# Patient Record
Sex: FEMALE | Race: BLACK OR AFRICAN AMERICAN | Employment: FULL TIME | URBAN - METROPOLITAN AREA
[De-identification: names, ages, dates, MRNs, and addresses within clinical notes are randomized per-mention and may not be internally consistent; named-entity substitution may affect disease eponyms.]

---

## 2024-01-16 ENCOUNTER — HOSPITAL ENCOUNTER (EMERGENCY)
Age: 44
Discharge: HOME | End: 2024-01-16
Attending: STUDENT IN AN ORGANIZED HEALTH CARE EDUCATION/TRAINING PROGRAM
Payer: MEDICAID

## 2024-01-16 VITALS
DIASTOLIC BLOOD PRESSURE: 78 MMHG | OXYGEN SATURATION: 100 % | HEART RATE: 86 BPM | TEMPERATURE: 97.8 F | SYSTOLIC BLOOD PRESSURE: 106 MMHG | RESPIRATION RATE: 16 BRPM

## 2024-01-16 DIAGNOSIS — J06.9 URI WITH COUGH AND CONGESTION: Primary | ICD-10-CM

## 2024-01-16 LAB
INFLUENZA A BY PCR: NOT DETECTED
INFLUENZA B BY PCR: NOT DETECTED
SARS-COV-2 RDRP RESP QL NAA+PROBE: NOT DETECTED
SPECIMEN DESCRIPTION: NORMAL

## 2024-01-16 PROCEDURE — 87635 SARS-COV-2 COVID-19 AMP PRB: CPT

## 2024-01-16 PROCEDURE — 87502 INFLUENZA DNA AMP PROBE: CPT

## 2024-01-16 PROCEDURE — 99283 EMERGENCY DEPT VISIT LOW MDM: CPT

## 2024-01-16 RX ORDER — GUAIFENESIN/DEXTROMETHORPHAN 100-10MG/5
5 SYRUP ORAL EVERY 8 HOURS PRN
Qty: 120 ML | Refills: 0 | Status: SHIPPED | OUTPATIENT
Start: 2024-01-16

## 2024-01-16 RX ORDER — METHYLPREDNISOLONE 4 MG/1
TABLET ORAL
Qty: 1 KIT | Refills: 0 | Status: SHIPPED | OUTPATIENT
Start: 2024-01-16

## 2024-01-16 RX ORDER — BENZONATATE 100 MG/1
100 CAPSULE ORAL EVERY 8 HOURS PRN
Qty: 15 CAPSULE | Refills: 0 | Status: SHIPPED | OUTPATIENT
Start: 2024-01-16

## 2024-01-16 RX ORDER — CETIRIZINE HYDROCHLORIDE 10 MG/1
10 TABLET ORAL DAILY
Qty: 10 TABLET | Refills: 0 | Status: SHIPPED | OUTPATIENT
Start: 2024-01-16 | End: 2024-01-26

## 2024-01-16 ASSESSMENT — PAIN - FUNCTIONAL ASSESSMENT: PAIN_FUNCTIONAL_ASSESSMENT: NONE - DENIES PAIN

## 2024-01-16 ASSESSMENT — ENCOUNTER SYMPTOMS
NAUSEA: 0
ABDOMINAL PAIN: 0
DIARRHEA: 0
VOMITING: 0
SHORTNESS OF BREATH: 0
RHINORRHEA: 1
COUGH: 1

## 2024-01-16 NOTE — DISCHARGE INSTRUCTIONS
Please return to the ER for any new or worsening symptoms including but not limited to Chest pain or difficulty breathing  If prescribed, please be sure to  your prescriptions from the pharmacy  Please follow-up with Primary care provider as instructed

## 2024-01-16 NOTE — ED PROVIDER NOTES
above; nasal congestion is noted.  Oropharynx is clear and patent without edema, exudates or erythema.  Lungs CTA bilaterally, no wheezes rales or rhonchi, no respiratory distress.   Labs reviewed, rapid covid and flu are negative.    Recommend symptomatic/supportive care.  Prescriptions were provided for cetirizine, Medrol Dosepak, Tessalon Perles, guaifenesin-dextromethorphan.  Patient is otherwise well-appearing with stable vital signs, I do feel she is appropriate for discharge.  Results and plan were discussed with the patient, she voiced understanding and is amenable.  Strict return precautions were discussed.      While not exhaustive, the following diagnoses and their severity were considered: Viral illness, sinusitis.      Independent interpretation of Laboratory tests by Carina Marquez DO: Rapid covid and flu are negative.     Independent interpretation of Radiology tests by Carina Marquez DO: None      EKG reviewed and interpreted by me:  This EKG is signed by emergency department physician.    An EKG was not performed during this encounter.       Labs & imaging were reviewed and interpreted, see RESULTS. I have personally reviewed all laboratory and imaging results for this patient.       Are there any additional factors to consider that affect care (uninsured, homeless, illiterate, history from another source, etc.) (If yes, which ones).  No      Name and Route of medications administered in the ED:  Medications - No data to display        Re-Evaluations:               --------------------------------------------- PAST HISTORY ---------------------------------------------  Past Medical History:  has no past medical history on file.    Past Surgical History:  has a past surgical history that includes salpingectomy; Ovary removal; and Hysterectomy (OCTOBER 9, 2013).    Social History:  reports that she quit smoking about 8 years ago. Her smoking use included cigarettes. She does not have any

## 2024-05-21 ENCOUNTER — OFFICE VISIT (OUTPATIENT)
Age: 44
End: 2024-05-21
Payer: MEDICAID

## 2024-05-21 VITALS
BODY MASS INDEX: 23.43 KG/M2 | HEART RATE: 97 BPM | OXYGEN SATURATION: 97 % | SYSTOLIC BLOOD PRESSURE: 94 MMHG | WEIGHT: 173 LBS | HEIGHT: 72 IN | DIASTOLIC BLOOD PRESSURE: 70 MMHG

## 2024-05-21 DIAGNOSIS — N63.20 MASS OF LEFT BREAST, UNSPECIFIED QUADRANT: ICD-10-CM

## 2024-05-21 DIAGNOSIS — N89.8 VAGINAL DISCHARGE: ICD-10-CM

## 2024-05-21 DIAGNOSIS — Z12.72 SCREENING FOR VAGINAL CANCER: Primary | ICD-10-CM

## 2024-05-21 DIAGNOSIS — Z12.31 BREAST CANCER SCREENING BY MAMMOGRAM: ICD-10-CM

## 2024-05-21 PROCEDURE — 99204 OFFICE O/P NEW MOD 45 MIN: CPT | Performed by: LEGAL MEDICINE

## 2024-05-21 PROCEDURE — G8420 CALC BMI NORM PARAMETERS: HCPCS | Performed by: LEGAL MEDICINE

## 2024-05-21 PROCEDURE — 4004F PT TOBACCO SCREEN RCVD TLK: CPT | Performed by: LEGAL MEDICINE

## 2024-05-21 PROCEDURE — G8427 DOCREV CUR MEDS BY ELIG CLIN: HCPCS | Performed by: LEGAL MEDICINE

## 2024-05-21 RX ORDER — METRONIDAZOLE 500 MG/1
500 TABLET ORAL
Qty: 14 TABLET | Refills: 0 | Status: SHIPPED | OUTPATIENT
Start: 2024-05-21 | End: 2024-05-28

## 2024-05-21 ASSESSMENT — ENCOUNTER SYMPTOMS
BLOOD IN STOOL: 0
ABDOMINAL DISTENTION: 0
CONSTIPATION: 0
RECTAL PAIN: 0
NAUSEA: 0
DIARRHEA: 0
ABDOMINAL PAIN: 0
VOMITING: 0

## 2024-05-21 NOTE — PROGRESS NOTES
Camila Robbins     Patient presents for vaginal discharge for 1 month.  Discharge has remained unchanged.  No problems with her bowel or urinary function.  Was told approximately a year ago there was a mass in the left breast, and was to have an ultrasound of the breast, but was incarcerated at that time.  She denies feeling any masses in the breast.    Family history is significant for mother with breast cancer in her 40s.  No genetic testing was done.    Past GYN history: History of abnormal Pap, history of chlamydia.  No DVT PE other STDs.    Past OB history: Spontaneous vaginal delivery x 2, spontaneous AB x 1, IUFD x 1, ectopic pregnancy x 1.          Past Medical History:   Diagnosis Date    Breast disorder June 2023    Calcium build up on left breast    Menopausal symptoms 2020    Night sweats/hot flashes        Past Surgical History:   Procedure Laterality Date    HYSTERECTOMY (CERVIX STATUS UNKNOWN)  OCTOBER 9, 2013    LAPAROSCOPIC, ROBOTIC ASSISTED    OVARY REMOVAL      SALPINGECTOMY          Family History   Problem Relation Age of Onset    Breast Cancer Mother         Social History       Tobacco History       Smoking Status  Every Day Last Attempt to Quit  6/1/2015 Average Packs/Day  0.5 packs/day for 15.0 years (7.5 ttl pk-yrs) Smoking Tobacco Type  Cigarettes last attempt to quit 6/1/2015   Pack Year History     Packs/Day From To Years    0 6/1/2015  9.0    0.5   15.0    0.5   0.0      Smokeless Tobacco Use  Unknown              Alcohol History       Alcohol Use Status  Yes Drinks/Week  0 Glasses of wine, 0 Cans of beer, 1-2 Shots of liquor, 0 Drinks containing 0.5 oz of alcohol per week Amount  1.0 - 2.0 standard drink of alcohol/wk Comment  socially              Drug Use       Drug Use Status  Yes Types  Marijuana (Weed) Frequency   7 times/week Comment  marijuana 3-4 x week last use 10/1/13              Sexual Activity       Sexually Active  Yes Partners  Male                      Current Outpatient

## 2024-05-24 DIAGNOSIS — N89.8 VAGINAL DISCHARGE: ICD-10-CM

## 2024-05-24 LAB — GYNECOLOGY CYTOLOGY REPORT: NORMAL

## 2024-07-21 ENCOUNTER — HOSPITAL ENCOUNTER (INPATIENT)
Age: 44
LOS: 1 days | Discharge: LEFT BEFORE DISCHARGE | End: 2024-07-23
Attending: STUDENT IN AN ORGANIZED HEALTH CARE EDUCATION/TRAINING PROGRAM | Admitting: SURGERY
Payer: MEDICAID

## 2024-07-21 DIAGNOSIS — K76.9 LIVER LESION: ICD-10-CM

## 2024-07-21 DIAGNOSIS — I95.9 HYPOTENSION, UNSPECIFIED HYPOTENSION TYPE: ICD-10-CM

## 2024-07-21 DIAGNOSIS — R10.9 ABDOMINAL PAIN, UNSPECIFIED ABDOMINAL LOCATION: Primary | ICD-10-CM

## 2024-07-21 PROCEDURE — 99285 EMERGENCY DEPT VISIT HI MDM: CPT

## 2024-07-21 ASSESSMENT — PAIN DESCRIPTION - FREQUENCY: FREQUENCY: CONTINUOUS

## 2024-07-21 ASSESSMENT — PAIN SCALES - GENERAL: PAINLEVEL_OUTOF10: 10

## 2024-07-21 ASSESSMENT — PAIN - FUNCTIONAL ASSESSMENT: PAIN_FUNCTIONAL_ASSESSMENT: 0-10

## 2024-07-21 ASSESSMENT — PAIN DESCRIPTION - PAIN TYPE: TYPE: ACUTE PAIN

## 2024-07-21 ASSESSMENT — LIFESTYLE VARIABLES: HOW OFTEN DO YOU HAVE A DRINK CONTAINING ALCOHOL: NEVER

## 2024-07-21 ASSESSMENT — PAIN DESCRIPTION - LOCATION: LOCATION: ABDOMEN

## 2024-07-22 ENCOUNTER — APPOINTMENT (OUTPATIENT)
Dept: GENERAL RADIOLOGY | Age: 44
End: 2024-07-22
Payer: MEDICAID

## 2024-07-22 ENCOUNTER — APPOINTMENT (OUTPATIENT)
Dept: CT IMAGING | Age: 44
End: 2024-07-22
Payer: MEDICAID

## 2024-07-22 ENCOUNTER — APPOINTMENT (OUTPATIENT)
Dept: MRI IMAGING | Age: 44
End: 2024-07-22
Payer: MEDICAID

## 2024-07-22 PROBLEM — F12.10 MARIJUANA ABUSE: Status: ACTIVE | Noted: 2024-07-22

## 2024-07-22 PROBLEM — E86.1 HYPOTENSION DUE TO HYPOVOLEMIA: Status: ACTIVE | Noted: 2024-07-22

## 2024-07-22 PROBLEM — R10.9 ABDOMINAL PAIN: Status: ACTIVE | Noted: 2024-07-22

## 2024-07-22 PROBLEM — R10.84 DIFFUSE ABDOMINAL PAIN: Status: ACTIVE | Noted: 2024-07-22

## 2024-07-22 PROBLEM — A08.4 VIRAL GASTROENTERITIS: Status: ACTIVE | Noted: 2024-07-22

## 2024-07-22 PROBLEM — E86.0 DEHYDRATION: Status: ACTIVE | Noted: 2024-07-22

## 2024-07-22 PROBLEM — Z72.0 TOBACCO ABUSE: Status: ACTIVE | Noted: 2024-07-22

## 2024-07-22 PROBLEM — E27.8 RIGHT ADRENAL MASS (HCC): Status: ACTIVE | Noted: 2024-07-22

## 2024-07-22 PROBLEM — K76.9 LIVER LESION: Status: ACTIVE | Noted: 2024-07-22

## 2024-07-22 PROBLEM — R16.0 LIVER MASSES: Status: ACTIVE | Noted: 2024-07-22

## 2024-07-22 LAB
ALBUMIN SERPL-MCNC: 4.1 G/DL (ref 3.5–5.2)
ALP SERPL-CCNC: 87 U/L (ref 35–104)
ALT SERPL-CCNC: 8 U/L (ref 0–32)
ANION GAP SERPL CALCULATED.3IONS-SCNC: 14 MMOL/L (ref 7–16)
AST SERPL-CCNC: 24 U/L (ref 0–31)
BACTERIA URNS QL MICRO: ABNORMAL
BASOPHILS # BLD: 0 K/UL (ref 0–0.2)
BASOPHILS NFR BLD: 0 % (ref 0–2)
BILIRUB SERPL-MCNC: 0.5 MG/DL (ref 0–1.2)
BILIRUB UR QL STRIP: ABNORMAL
BUN SERPL-MCNC: 20 MG/DL (ref 6–20)
CALCIUM SERPL-MCNC: 9 MG/DL (ref 8.6–10.2)
CASTS #/AREA URNS LPF: ABNORMAL /LPF
CHLORIDE SERPL-SCNC: 94 MMOL/L (ref 98–107)
CK SERPL-CCNC: 124 U/L (ref 20–180)
CLARITY UR: ABNORMAL
CO2 SERPL-SCNC: 24 MMOL/L (ref 22–29)
COLOR UR: YELLOW
CORTIS SERPL-MCNC: 25.9 UG/DL (ref 2.7–18.4)
CORTISOL COLLECTION INFO: ABNORMAL
CREAT SERPL-MCNC: 1.3 MG/DL (ref 0.5–1)
EOSINOPHIL # BLD: 0 K/UL (ref 0.05–0.5)
EOSINOPHILS RELATIVE PERCENT: 0 % (ref 0–6)
ERYTHROCYTE [DISTWIDTH] IN BLOOD BY AUTOMATED COUNT: 11.8 % (ref 11.5–15)
GFR, ESTIMATED: 53 ML/MIN/1.73M2
GLUCOSE SERPL-MCNC: 101 MG/DL (ref 74–99)
GLUCOSE UR STRIP-MCNC: NEGATIVE MG/DL
HCT VFR BLD AUTO: 45.9 % (ref 34–48)
HGB BLD-MCNC: 16 G/DL (ref 11.5–15.5)
HGB UR QL STRIP.AUTO: NEGATIVE
INFLUENZA A BY PCR: NOT DETECTED
INFLUENZA B BY PCR: NOT DETECTED
KETONES UR STRIP-MCNC: 40 MG/DL
LACTATE BLDV-SCNC: 1.8 MMOL/L (ref 0.5–1.9)
LEUKOCYTE ESTERASE UR QL STRIP: NEGATIVE
LIPASE SERPL-CCNC: 33 U/L (ref 13–60)
LYMPHOCYTES NFR BLD: 1.02 K/UL (ref 1.5–4)
LYMPHOCYTES RELATIVE PERCENT: 20 % (ref 20–42)
MCH RBC QN AUTO: 34.8 PG (ref 26–35)
MCHC RBC AUTO-ENTMCNC: 34.9 G/DL (ref 32–34.5)
MCV RBC AUTO: 99.8 FL (ref 80–99.9)
MONOCYTES NFR BLD: 0.35 K/UL (ref 0.1–0.95)
MONOCYTES NFR BLD: 7 % (ref 2–12)
MUCOUS THREADS URNS QL MICRO: PRESENT
NEUTROPHILS NFR BLD: 73 % (ref 43–80)
NEUTS SEG NFR BLD: 3.73 K/UL (ref 1.8–7.3)
NITRITE UR QL STRIP: NEGATIVE
PH UR STRIP: 6 [PH] (ref 5–9)
PLATELET, FLUORESCENCE: 117 K/UL (ref 130–450)
PMV BLD AUTO: 11.3 FL (ref 7–12)
POTASSIUM SERPL-SCNC: 3.6 MMOL/L (ref 3.5–5)
PROT SERPL-MCNC: 7.7 G/DL (ref 6.4–8.3)
PROT UR STRIP-MCNC: 100 MG/DL
RBC # BLD AUTO: 4.6 M/UL (ref 3.5–5.5)
RBC # BLD: ABNORMAL 10*6/UL
RBC # BLD: ABNORMAL 10*6/UL
RBC #/AREA URNS HPF: ABNORMAL /HPF
SARS-COV-2 RDRP RESP QL NAA+PROBE: NOT DETECTED
SODIUM SERPL-SCNC: 132 MMOL/L (ref 132–146)
SP GR UR STRIP: >1.03 (ref 1–1.03)
SPECIMEN DESCRIPTION: NORMAL
TROPONIN I SERPL HS-MCNC: <6 NG/L (ref 0–9)
UROBILINOGEN UR STRIP-ACNC: 1 EU/DL (ref 0–1)
WBC #/AREA URNS HPF: ABNORMAL /HPF
WBC OTHER # BLD: 5.1 K/UL (ref 4.5–11.5)

## 2024-07-22 PROCEDURE — 6370000000 HC RX 637 (ALT 250 FOR IP): Performed by: STUDENT IN AN ORGANIZED HEALTH CARE EDUCATION/TRAINING PROGRAM

## 2024-07-22 PROCEDURE — 87040 BLOOD CULTURE FOR BACTERIA: CPT

## 2024-07-22 PROCEDURE — A9577 INJ MULTIHANCE: HCPCS | Performed by: RADIOLOGY

## 2024-07-22 PROCEDURE — 74183 MRI ABD W/O CNTR FLWD CNTR: CPT

## 2024-07-22 PROCEDURE — 6360000004 HC RX CONTRAST MEDICATION: Performed by: RADIOLOGY

## 2024-07-22 PROCEDURE — 81001 URINALYSIS AUTO W/SCOPE: CPT

## 2024-07-22 PROCEDURE — 6370000000 HC RX 637 (ALT 250 FOR IP): Performed by: FAMILY MEDICINE

## 2024-07-22 PROCEDURE — 99222 1ST HOSP IP/OBS MODERATE 55: CPT | Performed by: INTERNAL MEDICINE

## 2024-07-22 PROCEDURE — 82550 ASSAY OF CK (CPK): CPT

## 2024-07-22 PROCEDURE — 2060000000 HC ICU INTERMEDIATE R&B

## 2024-07-22 PROCEDURE — 71046 X-RAY EXAM CHEST 2 VIEWS: CPT

## 2024-07-22 PROCEDURE — 96374 THER/PROPH/DIAG INJ IV PUSH: CPT

## 2024-07-22 PROCEDURE — 2580000003 HC RX 258: Performed by: SURGERY

## 2024-07-22 PROCEDURE — 85025 COMPLETE CBC W/AUTO DIFF WBC: CPT

## 2024-07-22 PROCEDURE — 83605 ASSAY OF LACTIC ACID: CPT

## 2024-07-22 PROCEDURE — 6360000002 HC RX W HCPCS: Performed by: STUDENT IN AN ORGANIZED HEALTH CARE EDUCATION/TRAINING PROGRAM

## 2024-07-22 PROCEDURE — 6360000002 HC RX W HCPCS: Performed by: SURGERY

## 2024-07-22 PROCEDURE — 80053 COMPREHEN METABOLIC PANEL: CPT

## 2024-07-22 PROCEDURE — 87086 URINE CULTURE/COLONY COUNT: CPT

## 2024-07-22 PROCEDURE — 74177 CT ABD & PELVIS W/CONTRAST: CPT

## 2024-07-22 PROCEDURE — 87635 SARS-COV-2 COVID-19 AMP PRB: CPT

## 2024-07-22 PROCEDURE — 82533 TOTAL CORTISOL: CPT

## 2024-07-22 PROCEDURE — 96361 HYDRATE IV INFUSION ADD-ON: CPT

## 2024-07-22 PROCEDURE — 96375 TX/PRO/DX INJ NEW DRUG ADDON: CPT

## 2024-07-22 PROCEDURE — 83690 ASSAY OF LIPASE: CPT

## 2024-07-22 PROCEDURE — 87502 INFLUENZA DNA AMP PROBE: CPT

## 2024-07-22 PROCEDURE — 99223 1ST HOSP IP/OBS HIGH 75: CPT | Performed by: SURGERY

## 2024-07-22 PROCEDURE — 2580000003 HC RX 258: Performed by: STUDENT IN AN ORGANIZED HEALTH CARE EDUCATION/TRAINING PROGRAM

## 2024-07-22 PROCEDURE — 84484 ASSAY OF TROPONIN QUANT: CPT

## 2024-07-22 PROCEDURE — 6370000000 HC RX 637 (ALT 250 FOR IP): Performed by: INTERNAL MEDICINE

## 2024-07-22 PROCEDURE — 36415 COLL VENOUS BLD VENIPUNCTURE: CPT

## 2024-07-22 PROCEDURE — 93005 ELECTROCARDIOGRAM TRACING: CPT | Performed by: STUDENT IN AN ORGANIZED HEALTH CARE EDUCATION/TRAINING PROGRAM

## 2024-07-22 RX ORDER — ONDANSETRON 2 MG/ML
4 INJECTION INTRAMUSCULAR; INTRAVENOUS EVERY 6 HOURS PRN
Status: DISCONTINUED | OUTPATIENT
Start: 2024-07-22 | End: 2024-07-23 | Stop reason: HOSPADM

## 2024-07-22 RX ORDER — ENOXAPARIN SODIUM 100 MG/ML
40 INJECTION SUBCUTANEOUS DAILY
Status: DISCONTINUED | OUTPATIENT
Start: 2024-07-22 | End: 2024-07-23 | Stop reason: HOSPADM

## 2024-07-22 RX ORDER — FENTANYL CITRATE 0.05 MG/ML
50 INJECTION, SOLUTION INTRAMUSCULAR; INTRAVENOUS ONCE
Status: COMPLETED | OUTPATIENT
Start: 2024-07-22 | End: 2024-07-22

## 2024-07-22 RX ORDER — SODIUM CHLORIDE 0.9 % (FLUSH) 0.9 %
5-40 SYRINGE (ML) INJECTION EVERY 12 HOURS SCHEDULED
Status: DISCONTINUED | OUTPATIENT
Start: 2024-07-22 | End: 2024-07-23 | Stop reason: HOSPADM

## 2024-07-22 RX ORDER — POTASSIUM CHLORIDE 20 MEQ/1
40 TABLET, EXTENDED RELEASE ORAL PRN
Status: DISCONTINUED | OUTPATIENT
Start: 2024-07-22 | End: 2024-07-23 | Stop reason: HOSPADM

## 2024-07-22 RX ORDER — 0.9 % SODIUM CHLORIDE 0.9 %
1000 INTRAVENOUS SOLUTION INTRAVENOUS ONCE
Status: COMPLETED | OUTPATIENT
Start: 2024-07-22 | End: 2024-07-22

## 2024-07-22 RX ORDER — ACETAMINOPHEN 500 MG
1000 TABLET ORAL ONCE
Status: COMPLETED | OUTPATIENT
Start: 2024-07-22 | End: 2024-07-22

## 2024-07-22 RX ORDER — DIPHENHYDRAMINE HYDROCHLORIDE 50 MG/ML
25 INJECTION INTRAMUSCULAR; INTRAVENOUS ONCE
Status: COMPLETED | OUTPATIENT
Start: 2024-07-22 | End: 2024-07-22

## 2024-07-22 RX ORDER — MAGNESIUM SULFATE IN WATER 40 MG/ML
2000 INJECTION, SOLUTION INTRAVENOUS PRN
Status: DISCONTINUED | OUTPATIENT
Start: 2024-07-22 | End: 2024-07-23 | Stop reason: HOSPADM

## 2024-07-22 RX ORDER — ACETAMINOPHEN 325 MG/1
650 TABLET ORAL EVERY 6 HOURS PRN
Status: DISCONTINUED | OUTPATIENT
Start: 2024-07-22 | End: 2024-07-23 | Stop reason: HOSPADM

## 2024-07-22 RX ORDER — POTASSIUM CHLORIDE 7.45 MG/ML
10 INJECTION INTRAVENOUS PRN
Status: DISCONTINUED | OUTPATIENT
Start: 2024-07-22 | End: 2024-07-23 | Stop reason: HOSPADM

## 2024-07-22 RX ORDER — MIDODRINE HYDROCHLORIDE 5 MG/1
5 TABLET ORAL
Status: DISCONTINUED | OUTPATIENT
Start: 2024-07-22 | End: 2024-07-23

## 2024-07-22 RX ORDER — ONDANSETRON 4 MG/1
4 TABLET, ORALLY DISINTEGRATING ORAL EVERY 8 HOURS PRN
Status: DISCONTINUED | OUTPATIENT
Start: 2024-07-22 | End: 2024-07-23 | Stop reason: HOSPADM

## 2024-07-22 RX ORDER — SODIUM CHLORIDE 9 MG/ML
INJECTION, SOLUTION INTRAVENOUS PRN
Status: DISCONTINUED | OUTPATIENT
Start: 2024-07-22 | End: 2024-07-23 | Stop reason: HOSPADM

## 2024-07-22 RX ORDER — TRAMADOL HYDROCHLORIDE 50 MG/1
50 TABLET ORAL EVERY 6 HOURS PRN
Status: DISCONTINUED | OUTPATIENT
Start: 2024-07-22 | End: 2024-07-23 | Stop reason: HOSPADM

## 2024-07-22 RX ORDER — TRAMADOL HYDROCHLORIDE 50 MG/1
25 TABLET ORAL EVERY 6 HOURS PRN
Status: DISCONTINUED | OUTPATIENT
Start: 2024-07-22 | End: 2024-07-23 | Stop reason: HOSPADM

## 2024-07-22 RX ORDER — MORPHINE SULFATE 4 MG/ML
4 INJECTION, SOLUTION INTRAMUSCULAR; INTRAVENOUS ONCE
Status: COMPLETED | OUTPATIENT
Start: 2024-07-22 | End: 2024-07-22

## 2024-07-22 RX ORDER — KETOROLAC TROMETHAMINE 30 MG/ML
30 INJECTION, SOLUTION INTRAMUSCULAR; INTRAVENOUS EVERY 6 HOURS PRN
Status: DISCONTINUED | OUTPATIENT
Start: 2024-07-22 | End: 2024-07-22

## 2024-07-22 RX ORDER — SODIUM CHLORIDE, SODIUM LACTATE, POTASSIUM CHLORIDE, CALCIUM CHLORIDE 600; 310; 30; 20 MG/100ML; MG/100ML; MG/100ML; MG/100ML
INJECTION, SOLUTION INTRAVENOUS CONTINUOUS
Status: DISCONTINUED | OUTPATIENT
Start: 2024-07-22 | End: 2024-07-23 | Stop reason: HOSPADM

## 2024-07-22 RX ORDER — SODIUM CHLORIDE 0.9 % (FLUSH) 0.9 %
5-40 SYRINGE (ML) INJECTION PRN
Status: DISCONTINUED | OUTPATIENT
Start: 2024-07-22 | End: 2024-07-23 | Stop reason: HOSPADM

## 2024-07-22 RX ADMIN — SODIUM CHLORIDE, PRESERVATIVE FREE 10 ML: 5 INJECTION INTRAVENOUS at 12:19

## 2024-07-22 RX ADMIN — IOPAMIDOL 80 ML: 755 INJECTION, SOLUTION INTRAVENOUS at 03:59

## 2024-07-22 RX ADMIN — ACETAMINOPHEN 650 MG: 325 TABLET ORAL at 23:01

## 2024-07-22 RX ADMIN — ENOXAPARIN SODIUM 40 MG: 100 INJECTION SUBCUTANEOUS at 12:18

## 2024-07-22 RX ADMIN — ACETAMINOPHEN 1000 MG: 500 TABLET ORAL at 03:09

## 2024-07-22 RX ADMIN — SODIUM CHLORIDE 1000 ML: 9 INJECTION, SOLUTION INTRAVENOUS at 06:15

## 2024-07-22 RX ADMIN — GADOBENATE DIMEGLUMINE 15 ML: 529 INJECTION, SOLUTION INTRAVENOUS at 15:43

## 2024-07-22 RX ADMIN — SODIUM CHLORIDE, POTASSIUM CHLORIDE, SODIUM LACTATE AND CALCIUM CHLORIDE: 600; 310; 30; 20 INJECTION, SOLUTION INTRAVENOUS at 12:22

## 2024-07-22 RX ADMIN — SODIUM CHLORIDE 1000 ML: 9 INJECTION, SOLUTION INTRAVENOUS at 05:06

## 2024-07-22 RX ADMIN — FENTANYL CITRATE 50 MCG: 50 INJECTION INTRAMUSCULAR; INTRAVENOUS at 00:51

## 2024-07-22 RX ADMIN — DIPHENHYDRAMINE HYDROCHLORIDE 25 MG: 50 INJECTION, SOLUTION INTRAMUSCULAR; INTRAVENOUS at 04:08

## 2024-07-22 RX ADMIN — MIDODRINE HYDROCHLORIDE 5 MG: 5 TABLET ORAL at 15:19

## 2024-07-22 RX ADMIN — MORPHINE SULFATE 4 MG: 4 INJECTION, SOLUTION INTRAMUSCULAR; INTRAVENOUS at 03:09

## 2024-07-22 RX ADMIN — WATER 80 MG: 1 INJECTION INTRAMUSCULAR; INTRAVENOUS; SUBCUTANEOUS at 04:11

## 2024-07-22 RX ADMIN — SODIUM CHLORIDE 1000 ML: 9 INJECTION, SOLUTION INTRAVENOUS at 03:11

## 2024-07-22 ASSESSMENT — PAIN SCALES - GENERAL: PAINLEVEL_OUTOF10: 7

## 2024-07-22 ASSESSMENT — ENCOUNTER SYMPTOMS
BACK PAIN: 0
COUGH: 1
NAUSEA: 0
VOMITING: 0
ABDOMINAL PAIN: 1
SHORTNESS OF BREATH: 0
DIARRHEA: 1
COLOR CHANGE: 0

## 2024-07-22 ASSESSMENT — LIFESTYLE VARIABLES
HOW OFTEN DO YOU HAVE A DRINK CONTAINING ALCOHOL: 2-4 TIMES A MONTH
HOW MANY STANDARD DRINKS CONTAINING ALCOHOL DO YOU HAVE ON A TYPICAL DAY: 1 OR 2

## 2024-07-22 ASSESSMENT — PAIN DESCRIPTION - DESCRIPTORS: DESCRIPTORS: ACHING

## 2024-07-22 ASSESSMENT — PAIN DESCRIPTION - LOCATION: LOCATION: HEAD

## 2024-07-22 NOTE — CONSULTS
[address] [address]                              CONSULTATION      PATIENT NAME: JAMES MUNGUIA                : 1980  MED REC NO: [id number]                        ROOM: 0528  ACCOUNT NO: [id number]                       ADMIT DATE: 2024  PROVIDER: Tono Yepez MD      CONSULT DATE: 2024    CHIEF COMPLAINT:  Adrenal mass.    HISTORY OF PRESENT ILLNESS:  This is a 44-year-old female who was admitted to the hospital for severe abdominal pain, underwent a CT of her abdomen.  She was noted to have a right adrenal enlargement which was measured at 3.5 cm.  She also had a CT done in , and it appeared that she had some enlargement of adrenal at that time, although was not as large as it is now.  The patient does not have any bothersome history of urinary problem such as hematuria or flank pain.  She does have a history of urgency and urgency incontinence.  She was given apparently oxybutynin in the past which did help, but it did dry her mouth out considerably, so she discontinued the medication.  She denies any episodes of severe hypertension or flushing.  Her abdominal pain which brought her to the hospital has abated and she is in the process of obtaining an MRI to evaluate her adrenal lesion.    PAST MEDICAL HISTORY:  Significant for breast disorder and menopausal symptoms.    PAST SURGICAL HISTORY:  She has had a hysterectomy and salpingectomy.    ALLERGIES:  TO MORPHINE.      MEDICATIONS:  Include prednisone, Tessalon, guaifenesin, naproxen, Lioresal.    SOCIAL HISTORY:  She does smoke cigarettes and does use marijuana at times.    PHYSICAL EXAMINATION:  GENERAL:  The patient is alert and oriented female, who appears to be comfortable.  ABDOMEN:  Unremarkable.    IMPRESSION:  Incidental finding of adrenal enlargement.  The patient is to have an MRI which hopefully would demonstrate perhaps the benign

## 2024-07-22 NOTE — ED PROVIDER NOTES
HPI   44-year-old female patient presents emergency department for evaluation of intermittent crampy abdominal pain ongoing for about 2 days.  Centered in the epigastrium.  She states that it comes on suddenly, then eases up and then comes back.  She had 1 episode of diarrhea, nonbloody.  She is also having associated cough, body aches and chills that has been ongoing for about 3 days.  No nausea vomiting.  No chest pain or shortness of breath.  Review of Systems   Constitutional:  Positive for fatigue and fever. Negative for chills.   HENT:  Negative for congestion.    Respiratory:  Positive for cough. Negative for shortness of breath.    Cardiovascular:  Negative for chest pain.   Gastrointestinal:  Positive for abdominal pain and diarrhea. Negative for nausea and vomiting.   Genitourinary:  Negative for difficulty urinating, dysuria and hematuria.   Musculoskeletal:  Negative for back pain.   Skin:  Negative for color change.   All other systems reviewed and are negative.       Physical Exam  Vitals and nursing note reviewed.   Constitutional:       Appearance: Normal appearance.   HENT:      Head: Normocephalic and atraumatic.      Nose: Nose normal. No congestion.      Mouth/Throat:      Mouth: Mucous membranes are moist.      Pharynx: Oropharynx is clear.   Eyes:      Conjunctiva/sclera: Conjunctivae normal.      Pupils: Pupils are equal, round, and reactive to light.   Cardiovascular:      Rate and Rhythm: Normal rate and regular rhythm.      Pulses: Normal pulses.      Heart sounds: Normal heart sounds.   Pulmonary:      Effort: Pulmonary effort is normal. No respiratory distress.      Breath sounds: Normal breath sounds.   Abdominal:      General: There is no distension.      Comments: Tenderness, epigastrium   Musculoskeletal:         General: Normal range of motion.      Cervical back: Normal range of motion and neck supple.   Skin:     General: Skin is warm and dry.      Capillary Refill: Capillary

## 2024-07-22 NOTE — H&P
General Surgery History and Physical    Patient's Name/Date of Birth: Camila Robbins / 1980    Date: July 22, 2024     Surgeon: Johnny Flores M.D.    PCP: No primary care provider on file.     Chief Complaint: abd pain and low bp    HPI:   Camila Robbins is a 44 y.o. female who presents for evaluation of abd pain and low bp for several days and came to ED for this reason and all workup was basically negative with likely dx of gastroenteritis but her bp stayed low so she was admitted for further monitoring. Her pain has since resolved and she tolerated a regular diet this am. Timing was constant, radiation to mid upper abdomen, alleviated by pain meds and time and started 3 days ago and severity is 2-9/10      Past Medical History:   Diagnosis Date    Breast disorder June 2023    Calcium build up on left breast    Menopausal symptoms 2020    Night sweats/hot flashes       Past Surgical History:   Procedure Laterality Date    HYSTERECTOMY (CERVIX STATUS UNKNOWN)  OCTOBER 9, 2013    LAPAROSCOPIC, ROBOTIC ASSISTED    OVARY REMOVAL      SALPINGECTOMY         Current Facility-Administered Medications   Medication Dose Route Frequency Provider Last Rate Last Admin    sodium chloride flush 0.9 % injection 5-40 mL  5-40 mL IntraVENous 2 times per day Johnny Flores MD        sodium chloride flush 0.9 % injection 5-40 mL  5-40 mL IntraVENous PRN Johnny Flores MD        0.9 % sodium chloride infusion   IntraVENous PRN Johnny Flores MD        potassium chloride (KLOR-CON M) extended release tablet 40 mEq  40 mEq Oral PRN Johnny Flores MD        Or    potassium bicarb-citric acid (EFFER-K) effervescent tablet 40 mEq  40 mEq Oral PRN Johnny Flores MD        Or    potassium chloride 10 mEq/100 mL IVPB (Peripheral Line)  10 mEq IntraVENous PRN Johnny Flores MD        magnesium sulfate 2000 mg in 50 mL IVPB premix  2,000 mg IntraVENous PRN Johnny Flores MD        ondansetron (ZOFRAN-ODT) disintegrating  tablet 4 mg  4 mg Oral Q8H PRN Johnny Flores MD        Or    ondansetron (ZOFRAN) injection 4 mg  4 mg IntraVENous Q6H PRN Johnny Flores MD        enoxaparin (LOVENOX) injection 40 mg  40 mg SubCUTAneous Daily Johnny Folres MD        lactated ringers IV soln infusion   IntraVENous Continuous Johnny Flores MD        traMADol (ULTRAM) tablet 25 mg  25 mg Oral Q6H PRN Johnny Flores MD        Or    traMADol (ULTRAM) tablet 50 mg  50 mg Oral Q6H PRN Johnny Flores MD        ketorolac (TORADOL) injection 30 mg  30 mg IntraVENous Q6H PRN Johnny Flores MD           Allergies   Allergen Reactions    Morphine Hives       The patient has a family history that is negative for severe cardiovascular or respiratory issues, negative for reaction to anesthesia.    Social History     Socioeconomic History    Marital status: Single     Spouse name: Not on file    Number of children: Not on file    Years of education: Not on file    Highest education level: Not on file   Occupational History    Not on file   Tobacco Use    Smoking status: Every Day     Current packs/day: 0.00     Average packs/day: 0.5 packs/day for 15.0 years (7.5 ttl pk-yrs)     Types: Cigarettes     Last attempt to quit: 2015     Years since quittin.1    Smokeless tobacco: Not on file   Substance and Sexual Activity    Alcohol use: Yes     Alcohol/week: 1.0 - 2.0 standard drink of alcohol     Types: 1 - 2 Shots of liquor per week     Comment: socially    Drug use: Yes     Frequency: 7.0 times per week     Types: Marijuana (Weed)     Comment: marijuana 3-4 x week last use 10/1/13    Sexual activity: Yes     Partners: Male   Other Topics Concern    Not on file   Social History Narrative    Not on file     Social Determinants of Health     Financial Resource Strain: Not on file   Food Insecurity: Not on file   Transportation Needs: Not on file   Physical Activity: Not on file   Stress: Not on file   Social Connections: Not on file

## 2024-07-22 NOTE — PROGRESS NOTES
4 Eyes Skin Assessment     NAME:  Camila Robbins  YOB: 1980  MEDICAL RECORD NUMBER:  [id number]    The patient is being assessed for  Admission    I agree that at least one RN has performed a thorough Head to Toe Skin Assessment on the patient. ALL assessment sites listed below have been assessed.      Areas assessed by both nurses:    Head, Face, Ears, Shoulders, Back, Chest, Arms, Elbows, Hands, Sacrum. Buttock, Coccyx, Ischium, and Legs. Feet and Heels        Does the Patient have a Wound? No noted wound(s)       Oracio Prevention initiated by RN: No;N/A  Wound Care Orders initiated by RN: No; N/A    Pressure Injury (Stage 3,4, Unstageable, DTI, NWPT, and Complex wounds) if present, place Wound referral order by RN under : No    New Ostomies, if present place, Ostomy referral order under : No     Nurse 1 eSignature: Electronically signed by Almita Pool RN on 7/22/24 at 12:35 PM EDT    **SHARE this note so that the co-signing nurse can place an eSignature**    Nurse 2 eSignature: {Esignature:[id number]}

## 2024-07-22 NOTE — CONSULTS
[facility]   Consult for Medical Management      Reason for Consult:  Medical management    History of Present Illness:  44 y.o. female with past medical history of tobacco abuse marijuana use  presents with abdominal pain for the last 2 or 3 days.  She has not eaten since Friday.  Its mainly epigastric with nausea but no vomiting she did have diarrhea a few times watery and she took Pepto-Bismol which made her stools black.  But because her abdominal pain was getting worse she came to the emergency room for further evaluation.  Patient was able to keep significant food or drinks down she did have some fever and chills she does not know what her fever was at home.  But in the emergency room her temperature was 100.8.    Patient stated that she did not eat anything different or outside the house she ate just a hot dog at home on Thursday before she started having those symptoms.  In the emergency room a CT scan of the abdomen revealed fluid-filled small bowel loops and possible enteritis and stomach appears thick.  Multiple areas on hyper enhanced in the liver will need to do MRI to evaluate and also right adrenal mass  Patient was given morphine in the emergency room her pressure was dropping in the 80s they gave her some fluids but still was in the 80s so she was admitted for further evaluation.  Informant(s) for H&P: patient and EMR     REVIEW OF SYSTEMS:  Complete ROS performed with patient, pertinent positives and negatives are listed in the HPI.    PMH:  Past Medical History:   Diagnosis Date    Breast disorder June 2023    Calcium build up on left breast    Menopausal symptoms 2020    Night sweats/hot flashes       Surgical History:  Past Surgical History:   Procedure Laterality Date    HYSTERECTOMY (CERVIX STATUS UNKNOWN)  OCTOBER 9, 2013    LAPAROSCOPIC, ROBOTIC ASSISTED    OVARY REMOVAL      SALPINGECTOMY         Medications Prior to Admission:    Prior to

## 2024-07-22 NOTE — DISCHARGE INSTRUCTIONS
Follow-up with gastroenterologist listed for the liver lesion.    Call upon discharge to schedule a follow-up visit with Martha Douglas/Lucho/Susie ([facility]) at [phone]

## 2024-07-22 NOTE — ED PROVIDER NOTES
ED Course as of 07/22/24 0907 Mon Jul 22, 2024   0122 EKG shows sinus rhythm 104 bpm, no acute ST elevations or depressions, QTc normal.  No prior to compare to.  Interpreted by me. [FG]      ED Course User Index  [FG] Lionel Sullivan DO     ED Course as of 07/22/24 0907 Mon Jul 22, 2024   0122 EKG shows sinus rhythm 104 bpm, no acute ST elevations or depressions, QTc normal.  No prior to compare to.  Interpreted by me. [FG]      ED Course User Index  [FG] Lionel Sullivan DO       --------------------------------------------- PAST HISTORY ---------------------------------------------  Past Medical History:  has a past medical history of Breast disorder and Menopausal symptoms.    Past Surgical History:  has a past surgical history that includes salpingectomy; Ovary removal; and Hysterectomy (OCTOBER 9, 2013).    Social History:  reports that she has been smoking cigarettes. She has a 7.5 pack-year smoking history. She does not have any smokeless tobacco history on file. She reports current alcohol use of about 1.0 - 2.0 standard drink of alcohol per week. She reports current drug use. Frequency: 7.00 times per week. Drug: Marijuana (Weed).    Family History: family history includes Breast Cancer in her mother.     The patient’s home medications have been reviewed.    Allergies: Morphine    -------------------------------------------------- RESULTS -------------------------------------------------    Lab  Results for orders placed or performed during the hospital encounter of 07/21/24   Culture, Blood 1    Specimen: Blood   Result Value Ref Range    Specimen Description .BLOOD     Special Requests          Culture NO GROWTH <24 HRS    Culture, Blood 2    Specimen: Blood   Result Value Ref Range    Specimen Description .BLOOD     Special Requests Site: Blood     Culture NO GROWTH <24 HRS    COVID-19, Rapid    Specimen: Nasopharyngeal Swab   Result Value Ref Range    Specimen Description .NASOPHARYNGEAL SWAB

## 2024-07-22 NOTE — ED NOTES
Ambulated patient in the hallway. HR 93 Sp02 97% Blood Pressure 98/57. No dizziness or weakness noted.

## 2024-07-23 VITALS
SYSTOLIC BLOOD PRESSURE: 99 MMHG | OXYGEN SATURATION: 97 % | WEIGHT: 160 LBS | HEART RATE: 82 BPM | HEIGHT: 72 IN | BODY MASS INDEX: 21.67 KG/M2 | RESPIRATION RATE: 20 BRPM | TEMPERATURE: 98.3 F | DIASTOLIC BLOOD PRESSURE: 64 MMHG

## 2024-07-23 LAB
ALBUMIN SERPL-MCNC: 3.4 G/DL (ref 3.5–5.2)
ALP SERPL-CCNC: 62 U/L (ref 35–104)
ALT SERPL-CCNC: 7 U/L (ref 0–32)
AMPHET UR QL SCN: NEGATIVE
ANION GAP SERPL CALCULATED.3IONS-SCNC: 12 MMOL/L (ref 7–16)
AST SERPL-CCNC: 27 U/L (ref 0–31)
BARBITURATES UR QL SCN: NEGATIVE
BASOPHILS # BLD: 0.01 K/UL (ref 0–0.2)
BASOPHILS NFR BLD: 0 % (ref 0–2)
BENZODIAZ UR QL: NEGATIVE
BILIRUB SERPL-MCNC: 0.7 MG/DL (ref 0–1.2)
BUN SERPL-MCNC: 9 MG/DL (ref 6–20)
BUPRENORPHINE UR QL: NEGATIVE
CALCIUM SERPL-MCNC: 8.5 MG/DL (ref 8.6–10.2)
CANNABINOIDS UR QL SCN: POSITIVE
CHLORIDE SERPL-SCNC: 102 MMOL/L (ref 98–107)
CO2 SERPL-SCNC: 24 MMOL/L (ref 22–29)
COCAINE UR QL SCN: NEGATIVE
CREAT SERPL-MCNC: 0.9 MG/DL (ref 0.5–1)
EKG ATRIAL RATE: 104 BPM
EKG P AXIS: 79 DEGREES
EKG P-R INTERVAL: 138 MS
EKG Q-T INTERVAL: 332 MS
EKG QRS DURATION: 72 MS
EKG QTC CALCULATION (BAZETT): 436 MS
EKG R AXIS: 94 DEGREES
EKG T AXIS: 61 DEGREES
EKG VENTRICULAR RATE: 104 BPM
EOSINOPHIL # BLD: 0 K/UL (ref 0.05–0.5)
EOSINOPHILS RELATIVE PERCENT: 0 % (ref 0–6)
ERYTHROCYTE [DISTWIDTH] IN BLOOD BY AUTOMATED COUNT: 12 % (ref 11.5–15)
FENTANYL UR QL: NEGATIVE
GFR, ESTIMATED: 86 ML/MIN/1.73M2
GLUCOSE SERPL-MCNC: 88 MG/DL (ref 74–99)
HCT VFR BLD AUTO: 40.2 % (ref 34–48)
HGB BLD-MCNC: 13.4 G/DL (ref 11.5–15.5)
LYMPHOCYTES NFR BLD: 2.03 K/UL (ref 1.5–4)
LYMPHOCYTES RELATIVE PERCENT: 56 % (ref 20–42)
MCH RBC QN AUTO: 33.8 PG (ref 26–35)
MCHC RBC AUTO-ENTMCNC: 33.3 G/DL (ref 32–34.5)
MCV RBC AUTO: 101.3 FL (ref 80–99.9)
METHADONE UR QL: NEGATIVE
MICROORGANISM SPEC CULT: NO GROWTH
MONOCYTES NFR BLD: 0.34 K/UL (ref 0.1–0.95)
MONOCYTES NFR BLD: 9 % (ref 2–12)
NEUTROPHILS NFR BLD: 34 % (ref 43–80)
NEUTS SEG NFR BLD: 1.25 K/UL (ref 1.8–7.3)
OPIATES UR QL SCN: NEGATIVE
OXYCODONE UR QL SCN: NEGATIVE
PCP UR QL SCN: NEGATIVE
PLATELET, FLUORESCENCE: 133 K/UL (ref 130–450)
PMV BLD AUTO: 11.2 FL (ref 7–12)
POTASSIUM SERPL-SCNC: 3.8 MMOL/L (ref 3.5–5)
PROT SERPL-MCNC: 6 G/DL (ref 6.4–8.3)
RBC # BLD AUTO: 3.97 M/UL (ref 3.5–5.5)
RBC # BLD: NORMAL 10*6/UL
SERVICE CMNT-IMP: NORMAL
SODIUM SERPL-SCNC: 138 MMOL/L (ref 132–146)
SPECIMEN DESCRIPTION: NORMAL
TEST INFORMATION: ABNORMAL
WBC OTHER # BLD: 3.6 K/UL (ref 4.5–11.5)

## 2024-07-23 PROCEDURE — 99239 HOSP IP/OBS DSCHRG MGMT >30: CPT | Performed by: SURGERY

## 2024-07-23 PROCEDURE — 84244 ASSAY OF RENIN: CPT

## 2024-07-23 PROCEDURE — 82088 ASSAY OF ALDOSTERONE: CPT

## 2024-07-23 PROCEDURE — 85025 COMPLETE CBC W/AUTO DIFF WBC: CPT

## 2024-07-23 PROCEDURE — 93010 ELECTROCARDIOGRAM REPORT: CPT | Performed by: INTERNAL MEDICINE

## 2024-07-23 PROCEDURE — 6360000002 HC RX W HCPCS: Performed by: SURGERY

## 2024-07-23 PROCEDURE — 80053 COMPREHEN METABOLIC PANEL: CPT

## 2024-07-23 PROCEDURE — 6370000000 HC RX 637 (ALT 250 FOR IP): Performed by: FAMILY MEDICINE

## 2024-07-23 PROCEDURE — 36415 COLL VENOUS BLD VENIPUNCTURE: CPT

## 2024-07-23 PROCEDURE — 99232 SBSQ HOSP IP/OBS MODERATE 35: CPT | Performed by: INTERNAL MEDICINE

## 2024-07-23 PROCEDURE — 80307 DRUG TEST PRSMV CHEM ANLYZR: CPT

## 2024-07-23 RX ORDER — MIDODRINE HYDROCHLORIDE 5 MG/1
10 TABLET ORAL
Status: DISCONTINUED | OUTPATIENT
Start: 2024-07-23 | End: 2024-07-23 | Stop reason: HOSPADM

## 2024-07-23 RX ORDER — OMEPRAZOLE 20 MG/1
20 TABLET, DELAYED RELEASE ORAL DAILY
Qty: 30 TABLET | Refills: 3 | Status: SHIPPED | OUTPATIENT
Start: 2024-07-23

## 2024-07-23 RX ADMIN — MIDODRINE HYDROCHLORIDE 10 MG: 5 TABLET ORAL at 12:12

## 2024-07-23 RX ADMIN — MIDODRINE HYDROCHLORIDE 10 MG: 5 TABLET ORAL at 08:41

## 2024-07-23 RX ADMIN — ENOXAPARIN SODIUM 40 MG: 100 INJECTION SUBCUTANEOUS at 08:41

## 2024-07-23 ASSESSMENT — ENCOUNTER SYMPTOMS
RHINORRHEA: 0
DIARRHEA: 0
SHORTNESS OF BREATH: 0
SINUS PAIN: 0
CONSTIPATION: 0
NAUSEA: 0
WHEEZING: 0
BLOOD IN STOOL: 0
ABDOMINAL PAIN: 0
FACIAL SWELLING: 0
COUGH: 0
ABDOMINAL DISTENTION: 0
CHEST TIGHTNESS: 0

## 2024-07-23 NOTE — DISCHARGE SUMMARY
Physician Discharge Summary     Camila Robbins  [id number]    Admit date: 7/21/2024    Discharge date and time: No discharge date for patient encounter.     Admitting Physician: Johnny Flores MD     Admission Diagnoses: Liver lesion [K76.9]  Diffuse abdominal pain [R10.84]  Abdominal pain, unspecified abdominal location [R10.9]  Hypotension, unspecified hypotension type [I95.9]    Discharge diagnosis: same    Condition at discharge: stable        Hospital Course: Had uncomplicated epigastric pain and hypotension and uncomplicated  course with resolution of symptoms likely secondary to gastroenteritis and was discharged tolerating a general diet, having good bowel function, ambulating independently and with adequate analgesia.    Discharge Exam: BP 93/64   Pulse 71   Temp 98 °F (36.7 °C) (Oral)   Resp 12   Ht 1.829 m (6')   Wt 72.6 kg (160 lb)   LMP 09/26/2013   SpO2 98%   BMI 21.70 kg/m²     General appearance: alert, appears stated age and cooperative  Head: Normocephalic, without obvious abnormality, atraumatic  Neck: no adenopathy, no carotid bruit, no JVD, supple, symmetrical, trachea midline and thyroid not enlarged, symmetric, no tenderness/mass/nodules  Lungs: clear to auscultation bilaterally  Heart: regular rate and rhythm, S1, S2 normal, no murmur, click, rub or gallop  Abdomen: soft, non-tender; bowel sounds normal; no masses,  no organomegaly and incisions clean and dry  Extremities: extremities normal, atraumatic, no cyanosis or edema      Disposition: home    Patient Instructions:     Activity: activity as tolerated  Diet: regular diet      Follow-up with Dr. Flores in 2 weeks to discuss EGD to evaluate for ulcer due to episode of melena.  Over 30 min spent preparing discharge and discussing it and follow up instructions with patient.  Signed:  Johnny Flores MD  7/23/2024  10:33 AM

## 2024-07-23 NOTE — PROGRESS NOTES
7/23/2024 11:52 AM  Camila Robbins  [id number]    Subjective:    No complaints  Wants to go home     Review of Systems  Constitutional: No fever or chills   Respiratory: negative for cough and hemoptysis  Cardiovascular: negative for chest pain and dyspnea  Gastrointestinal: negative for abdominal pain, diarrhea, nausea and vomiting   : See above  Derm: negative for rash and skin lesion(s)  Neurological: negative for seizures and tremors  Musculoskeletal: Negative    Psychiatric: Negative   All other reviews are negative      Scheduled Meds:   midodrine  10 mg Oral TID WC    sodium chloride flush  5-40 mL IntraVENous 2 times per day    enoxaparin  40 mg SubCUTAneous Daily       Objective:  Vitals:    07/23/24 1110   BP: 96/73   Pulse: 91   Resp: 22   Temp: 98.3 °F (36.8 °C)   SpO2:          Allergies: Morphine    General Appearance: alert and oriented to person, place and time and in no acute distress  Skin: no rash or erythema  Head: normocephalic and atraumatic  Pulmonary/Chest: normal air movement, no respiratory distress  Abdomen: soft, non-tender, non-distended  Genitourinary: no andersen   Extremities: no cyanosis, clubbing or edema         Labs:     Recent Labs     07/23/24  0547      K 3.8      CO2 24   BUN 9   CREATININE 0.9   GLUCOSE 88   CALCIUM 8.5*       Lab Results   Component Value Date/Time    HGB 13.4 07/23/2024 05:47 AM    HCT 40.2 07/23/2024 05:47 AM       No results found for: \"PSA\"      Assessment/Plan:  Right adrenal nodule    MRI yesterday favors right adrenal nodule  Will have patient follow up as an outpatient for adrenal studies  She is ok for discharge from a  standpoint today  Will arrange outpatient follow up     Yessenia Burk, APRN - CNP   [facility]  Urology

## 2024-07-23 NOTE — PROGRESS NOTES
Reviewed with pt regarding risks of leaving hospital against medical advice. Knowing this information, the pt would still like to leave AMA. Paperwork signed in conjunction with this decision.

## 2024-07-23 NOTE — PROGRESS NOTES
Progress Note  Date:2024       Room:0528/0528-01  Patient Name:Camila Robbins     YOB: 1980     Age:44 y.o.        Subjective    Subjective:  Symptoms:  Improved.  No shortness of breath, cough, chest pain, weakness or diarrhea.  (Tolerating diet well ).    Diet:  Adequate intake.  No nausea.    Activity level: Returning to normal.       Review of Systems   Constitutional:  Negative for appetite change, chills, fatigue, fever and unexpected weight change.   HENT:  Negative for congestion, facial swelling, mouth sores, rhinorrhea and sinus pain.    Eyes:  Negative for visual disturbance.   Respiratory:  Negative for cough, chest tightness, shortness of breath and wheezing.    Cardiovascular:  Negative for chest pain and leg swelling.   Gastrointestinal:  Negative for abdominal distention, abdominal pain, blood in stool, constipation, diarrhea and nausea.        Dark stool   Genitourinary:  Negative for difficulty urinating, dysuria, frequency and urgency.   Musculoskeletal:  Negative for joint swelling.   Skin:  Negative for rash.   Neurological:  Negative for dizziness, syncope, weakness, light-headedness and headaches.   Psychiatric/Behavioral:  Negative for behavioral problems, confusion and hallucinations.      Objective         Vitals Last 24 Hours:  TEMPERATURE:  Temp  Av.2 °F (36.8 °C)  Min: 97.9 °F (36.6 °C)  Max: 98.5 °F (36.9 °C)  RESPIRATIONS RANGE: Resp  Av.3  Min: 12  Max: 27  PULSE OXIMETRY RANGE: SpO2  Av.3 %  Min: 93 %  Max: 98 %  PULSE RANGE: Pulse  Av.2  Min: 71  Max: 95  BLOOD PRESSURE RANGE: Systolic (24hrs), Av , Min:75 , Max:100   ; Diastolic (24hrs), Av, Min:54, Max:73    I/O (24Hr):    Intake/Output Summary (Last 24 hours) at 2024 1318  Last data filed at 2024 2304  Gross per 24 hour   Intake --   Output 250 ml   Net -250 ml     Objective:  General Appearance:  Comfortable and in no acute distress.    Vital signs: (most recent): Blood

## 2024-07-23 NOTE — CARE COORDINATION
7/23/2024 1054a ()  NOTE: SW attempted to meet with pt to discuss dc planning/needs. Pt declined SW assessment stating she does not need anything. Therefore, SW unable to complete.  Pt was sitting upright and per nursing ambulatory. Pt on room air.   Pt does not have a pcp listed. SW attempted to discuss getting pcp arranged for pt, however, she declined stating she wanted to \"go outside and have a cigarette\".     DC order noted.     Electronically signed by ABBI Maravilla on 7/23/2024 at 10:57 AM

## 2024-07-26 LAB
ALDOST SERPL-MCNC: 4.9 NG/DL
ALDOSTERONE COMMENT: NORMAL
RENIN COMMENT: NORMAL
RENIN PLAS-CCNC: 0.8 NG/ML/HR

## 2024-07-27 LAB
MICROORGANISM SPEC CULT: NORMAL
SERVICE CMNT-IMP: NORMAL
SPECIMEN DESCRIPTION: NORMAL

## 2024-08-21 PROBLEM — E86.0 DEHYDRATION: Status: RESOLVED | Noted: 2024-07-22 | Resolved: 2024-08-21

## 2024-11-03 ENCOUNTER — HOSPITAL ENCOUNTER (EMERGENCY)
Age: 44
Discharge: HOME | End: 2024-11-03
Attending: FAMILY MEDICINE
Payer: MEDICAID

## 2024-11-03 VITALS
RESPIRATION RATE: 16 BRPM | BODY MASS INDEX: 20.72 KG/M2 | WEIGHT: 153 LBS | HEIGHT: 72 IN | HEART RATE: 102 BPM | OXYGEN SATURATION: 98 % | SYSTOLIC BLOOD PRESSURE: 107 MMHG | DIASTOLIC BLOOD PRESSURE: 68 MMHG | TEMPERATURE: 97.8 F

## 2024-11-03 DIAGNOSIS — N89.8 VAGINAL ITCHING: Primary | ICD-10-CM

## 2024-11-03 LAB
BACTERIA URNS QL MICRO: ABNORMAL
BILIRUB UR QL STRIP: NEGATIVE
CLARITY UR: ABNORMAL
COLOR UR: YELLOW
EPI CELLS #/AREA URNS HPF: ABNORMAL /HPF
GLUCOSE UR STRIP-MCNC: NEGATIVE MG/DL
HGB UR QL STRIP.AUTO: NEGATIVE
KETONES UR STRIP-MCNC: ABNORMAL MG/DL
LEUKOCYTE ESTERASE UR QL STRIP: NEGATIVE
NITRITE UR QL STRIP: POSITIVE
PH UR STRIP: 6 [PH] (ref 5–9)
PROT UR STRIP-MCNC: ABNORMAL MG/DL
RBC #/AREA URNS HPF: ABNORMAL /HPF
SP GR UR STRIP: >1.03 (ref 1–1.03)
UROBILINOGEN UR STRIP-ACNC: 0.2 EU/DL (ref 0–1)
WBC #/AREA URNS HPF: ABNORMAL /HPF

## 2024-11-03 PROCEDURE — 81001 URINALYSIS AUTO W/SCOPE: CPT

## 2024-11-03 PROCEDURE — 99283 EMERGENCY DEPT VISIT LOW MDM: CPT

## 2024-11-03 RX ORDER — FLUCONAZOLE 150 MG/1
150 TABLET ORAL ONCE
Qty: 1 TABLET | Refills: 0 | Status: SHIPPED | OUTPATIENT
Start: 2024-11-03 | End: 2024-11-03

## 2024-11-03 ASSESSMENT — PAIN - FUNCTIONAL ASSESSMENT: PAIN_FUNCTIONAL_ASSESSMENT: NONE - DENIES PAIN

## 2024-11-03 NOTE — ED PROVIDER NOTES
HPI:  11/3/24,   Time: 1:51 PM JOHANNE Robbins is a 44 y.o. female presenting to the ED for 1 week history of vaginal itching, without vaginal discharge or dysuria or frequency or urgency of urine.  She has been using an over-the-counter antifungal cream for the past 6 days but it has not improved her symptoms.        ROS:   Pertinent positives and negatives are stated within HPI, all other systems reviewed and are negative.  --------------------------------------------- PAST HISTORY ---------------------------------------------  Past Medical History:  has a past medical history of Breast disorder and Menopausal symptoms.    Past Surgical History:  has a past surgical history that includes salpingectomy; Ovary removal; and Hysterectomy (OCTOBER 9, 2013).    Social History:  reports that she has been smoking cigarettes. She has a 7.5 pack-year smoking history. She does not have any smokeless tobacco history on file. She reports current alcohol use of about 1.0 - 2.0 standard drink of alcohol per week. She reports current drug use. Frequency: 7.00 times per week. Drug: Marijuana (Weed).    Family History: family history includes Breast Cancer in her mother.     The patient’s home medications have been reviewed.    Allergies: Morphine    -------------------------------------------------- RESULTS -------------------------------------------------  All laboratory and radiology results have been personally reviewed by myself   LABS:  Results for orders placed or performed during the hospital encounter of 11/03/24   Urinalysis with Microscopic   Result Value Ref Range    Color, UA Yellow Yellow    Turbidity UA SLIGHTLY CLOUDY (A) Clear    Glucose, Ur NEGATIVE NEGATIVE mg/dL    Bilirubin, Urine NEGATIVE NEGATIVE    Ketones, Urine TRACE (A) NEGATIVE mg/dL    Specific Gravity, UA >1.030 (H) 1.005 - 1.030    Urine Hgb NEGATIVE NEGATIVE    pH, Urine 6.0 5.0 - 9.0    Protein, UA TRACE (A) NEGATIVE mg/dL

## 2024-11-11 ENCOUNTER — OFFICE VISIT (OUTPATIENT)
Age: 44
End: 2024-11-11
Payer: MEDICAID

## 2024-11-11 VITALS
SYSTOLIC BLOOD PRESSURE: 104 MMHG | WEIGHT: 155.8 LBS | HEIGHT: 72 IN | BODY MASS INDEX: 21.1 KG/M2 | TEMPERATURE: 98.1 F | DIASTOLIC BLOOD PRESSURE: 63 MMHG | OXYGEN SATURATION: 94 % | HEART RATE: 93 BPM

## 2024-11-11 DIAGNOSIS — Z12.31 ENCOUNTER FOR SCREENING MAMMOGRAM FOR HIGH-RISK PATIENT: ICD-10-CM

## 2024-11-11 DIAGNOSIS — N89.8 VAGINAL DISCHARGE: Primary | ICD-10-CM

## 2024-11-11 PROCEDURE — 99213 OFFICE O/P EST LOW 20 MIN: CPT | Performed by: OBSTETRICS & GYNECOLOGY

## 2024-11-11 PROCEDURE — 99459 PELVIC EXAMINATION: CPT | Performed by: OBSTETRICS & GYNECOLOGY

## 2024-11-11 PROCEDURE — 4004F PT TOBACCO SCREEN RCVD TLK: CPT | Performed by: OBSTETRICS & GYNECOLOGY

## 2024-11-11 PROCEDURE — G8427 DOCREV CUR MEDS BY ELIG CLIN: HCPCS | Performed by: OBSTETRICS & GYNECOLOGY

## 2024-11-11 PROCEDURE — G8420 CALC BMI NORM PARAMETERS: HCPCS | Performed by: OBSTETRICS & GYNECOLOGY

## 2024-11-11 PROCEDURE — G8484 FLU IMMUNIZE NO ADMIN: HCPCS | Performed by: OBSTETRICS & GYNECOLOGY

## 2024-11-11 SDOH — ECONOMIC STABILITY: TRANSPORTATION INSECURITY
IN THE PAST 12 MONTHS, HAS LACK OF TRANSPORTATION KEPT YOU FROM MEETINGS, WORK, OR FROM GETTING THINGS NEEDED FOR DAILY LIVING?: NO

## 2024-11-11 SDOH — ECONOMIC STABILITY: FOOD INSECURITY: WITHIN THE PAST 12 MONTHS, THE FOOD YOU BOUGHT JUST DIDN'T LAST AND YOU DIDN'T HAVE MONEY TO GET MORE.: OFTEN TRUE

## 2024-11-11 SDOH — ECONOMIC STABILITY: FOOD INSECURITY: WITHIN THE PAST 12 MONTHS, YOU WORRIED THAT YOUR FOOD WOULD RUN OUT BEFORE YOU GOT MONEY TO BUY MORE.: OFTEN TRUE

## 2024-11-11 SDOH — ECONOMIC STABILITY: INCOME INSECURITY: HOW HARD IS IT FOR YOU TO PAY FOR THE VERY BASICS LIKE FOOD, HOUSING, MEDICAL CARE, AND HEATING?: HARD

## 2024-11-11 ASSESSMENT — PATIENT HEALTH QUESTIONNAIRE - PHQ9
SUM OF ALL RESPONSES TO PHQ9 QUESTIONS 1 & 2: 0
1. LITTLE INTEREST OR PLEASURE IN DOING THINGS: NOT AT ALL
SUM OF ALL RESPONSES TO PHQ QUESTIONS 1-9: 0
SUM OF ALL RESPONSES TO PHQ QUESTIONS 1-9: 0
2. FEELING DOWN, DEPRESSED OR HOPELESS: NOT AT ALL
SUM OF ALL RESPONSES TO PHQ QUESTIONS 1-9: 0
SUM OF ALL RESPONSES TO PHQ QUESTIONS 1-9: 0

## 2024-11-11 NOTE — PROGRESS NOTES
Pt here for vaginal discharge, used OTC yeast infection cream for 5 days  went to Urgent Care for discomfort, was given Diflucan which has not cleared it up  itching gone but still having discharge  Last mammo 2023  Last PAP 5/21/24  Hysterectomy 2013 due to fibroids    
SpO2 94%   BMI 21.13 kg/m²   General appearance: alert, appears stated age, and cooperative  Head: Normocephalic, without obvious abnormality, atraumatic  Eyes: conjunctivae/corneas clear. PERRL, EOM's intact. Fundi benign.  Ears: normal TM's and external ear canals both ears  Nose: Nares normal. Septum midline. Mucosa normal. No drainage or sinus tenderness.  Throat: lips, mucosa, and tongue normal; teeth and gums normal  Neck: no adenopathy, no carotid bruit, no JVD, supple, symmetrical, trachea midline, and thyroid not enlarged, symmetric, no tenderness/mass/nodules  Back: symmetric, no curvature. ROM normal. No CVA tenderness.  Lungs: clear to auscultation bilaterally  Breasts: normal appearance, no masses or tenderness  Heart: regular rate and rhythm, S1, S2 normal, no murmur, click, rub or gallop  Abdomen: soft, non-tender; bowel sounds normal; no masses,  no organomegaly  Pelvic: external genitalia normal, no adnexal masses or tenderness, rectovaginal septum normal, vagina normal without discharge, status post hysterectomy with well-healed vaginal cuff.  Patient has mild vaginal discharge but no other abnormalities.  No adnexal masses no tenderness on exam  Extremities: extremities normal, atraumatic, no cyanosis or edema  Pulses: 2+ and symmetric  Skin: Skin color, texture, turgor normal. No rashes or lesions  Lymph nodes: Cervical, supraclavicular, and axillary nodes normal.  Neurologic: Grossly normal      Assessment:      Healthy female exam.  Follow-up with culture mammogram results as     Plan:    Education Reviewed and Recommended: Follow-up results as needed.       Chaperone present for exam discussion

## 2024-11-12 DIAGNOSIS — N89.8 VAGINAL DISCHARGE: ICD-10-CM

## 2024-11-12 DIAGNOSIS — N76.0 BV (BACTERIAL VAGINOSIS): Primary | ICD-10-CM

## 2024-11-12 DIAGNOSIS — B96.89 BV (BACTERIAL VAGINOSIS): Primary | ICD-10-CM

## 2024-11-13 RX ORDER — METRONIDAZOLE 500 MG/1
500 TABLET ORAL 2 TIMES DAILY
COMMUNITY
End: 2024-11-13 | Stop reason: SDUPTHER

## 2024-11-13 RX ORDER — METRONIDAZOLE 500 MG/1
500 TABLET ORAL 2 TIMES DAILY
Qty: 14 TABLET | Refills: 0 | OUTPATIENT
Start: 2024-11-13 | End: 2024-11-20

## 2024-11-18 RX ORDER — METRONIDAZOLE 500 MG/1
500 TABLET ORAL 2 TIMES DAILY
Qty: 14 TABLET | Refills: 0 | Status: SHIPPED | OUTPATIENT
Start: 2024-11-18 | End: 2024-11-25

## 2024-11-18 NOTE — TELEPHONE ENCOUNTER
Requested Prescriptions     Pending Prescriptions Disp Refills    metroNIDAZOLE (FLAGYL) 500 MG tablet 14 tablet 0     Sig: Take 1 tablet by mouth 2 times daily for 7 days    PHARMACY DID NOT RECEIVE PRESCRIPTION

## 2025-01-08 ENCOUNTER — TRANSCRIBE ORDERS (OUTPATIENT)
Dept: ADMINISTRATIVE | Age: 45
End: 2025-01-08

## 2025-01-08 DIAGNOSIS — K76.89 LIVER NODULE: Primary | ICD-10-CM
